# Patient Record
Sex: FEMALE | Race: WHITE | NOT HISPANIC OR LATINO | Employment: UNEMPLOYED | URBAN - METROPOLITAN AREA
[De-identification: names, ages, dates, MRNs, and addresses within clinical notes are randomized per-mention and may not be internally consistent; named-entity substitution may affect disease eponyms.]

---

## 2017-05-04 ENCOUNTER — HOSPITAL ENCOUNTER (EMERGENCY)
Facility: MEDICAL CENTER | Age: 42
End: 2017-05-04
Attending: EMERGENCY MEDICINE
Payer: MEDICAID

## 2017-05-04 VITALS
WEIGHT: 250 LBS | DIASTOLIC BLOOD PRESSURE: 49 MMHG | OXYGEN SATURATION: 97 % | SYSTOLIC BLOOD PRESSURE: 100 MMHG | BODY MASS INDEX: 37.03 KG/M2 | RESPIRATION RATE: 14 BRPM | HEIGHT: 69 IN | TEMPERATURE: 97.2 F | HEART RATE: 88 BPM

## 2017-05-04 VITALS
HEIGHT: 68 IN | TEMPERATURE: 98.2 F | SYSTOLIC BLOOD PRESSURE: 127 MMHG | DIASTOLIC BLOOD PRESSURE: 51 MMHG | WEIGHT: 260 LBS | HEART RATE: 71 BPM | RESPIRATION RATE: 19 BRPM | BODY MASS INDEX: 39.4 KG/M2
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
## 2017-05-04 LAB — GLUCOSE BLD-MCNC: 155 MG/DL (ref 65–99)

## 2017-05-04 PROCEDURE — 99283 EMERGENCY DEPT VISIT LOW MDM: CPT

## 2017-05-04 PROCEDURE — 302449 STATCHG TRIAGE ONLY (STATISTIC)

## 2017-05-04 PROCEDURE — 82962 GLUCOSE BLOOD TEST: CPT

## 2017-05-04 PROCEDURE — 36415 COLL VENOUS BLD VENIPUNCTURE: CPT

## 2017-05-04 RX ORDER — SODIUM CHLORIDE 9 MG/ML
1000 INJECTION, SOLUTION INTRAVENOUS CONTINUOUS
Status: DISCONTINUED | OUTPATIENT
Start: 2017-05-04 | End: 2017-05-04 | Stop reason: HOSPADM

## 2017-05-04 RX ORDER — PHENOBARBITAL 30 MG/1
86 TABLET ORAL 2 TIMES DAILY
COMMUNITY
End: 2017-07-17

## 2017-05-04 RX ORDER — LISINOPRIL 10 MG/1
10 TABLET ORAL DAILY
COMMUNITY
End: 2017-07-17

## 2017-05-04 RX ORDER — HYDROCODONE BITARTRATE AND ACETAMINOPHEN 10; 325 MG/1; MG/1
1-2 TABLET ORAL EVERY 6 HOURS PRN
Qty: 8 TAB | Refills: 0 | Status: SHIPPED | OUTPATIENT
Start: 2017-05-04 | End: 2017-07-17

## 2017-05-04 ASSESSMENT — PAIN SCALES - GENERAL: PAINLEVEL_OUTOF10: 8

## 2017-05-04 NOTE — ED AVS SNAPSHOT
[employer] Access Code: [email or web address]  Expires: 6/3/2017  8:24 PM    Your email address is not on file at [facility].  Email Addresses are required for you to sign up for [employer], please contact [phone] to verify your personal information and to provide your email address prior to attempting to register for [employer].    Nohemi Michele  [address] [place] [address]    Spectralmindt  A secure, online tool to manage your health information     [facility]’s [employer]® is a secure, online tool that connects you to your personalized health information from the privacy of your home -- day or night - making it very easy for you to manage your healthcare. Once the activation process is completed, you can even access your medical information using the [employer] karma, which is available for free in the Apple Karma store or Google Play store.     To learn more about [employer], visit [email or web address]    There are two levels of access available (as shown below):   My Chart Features  [facility] Primary Care Doctor [facility]  Specialists [facility]  Urgent  Care Non-[facility] Primary Care Doctor   Email your healthcare team securely and privately 24/7 X X X    Manage appointments: schedule your next appointment; view details of past/upcoming appointments X      Request prescription refills. X      View recent personal medical records, including lab and immunizations X X X X   View health record, including health history, allergies, medications X X X X   Read reports about your outpatient visits, procedures, consult and ER notes X X X X   See your discharge summary, which is a recap of your hospital and/or ER visit that includes your diagnosis, lab results, and care plan X X  X     How to register for [employer]t:  Once your e-mail address has been verified, follow the following steps to sign up for [employer].     1. Go to  [email or web address]  2. Click on the Sign Up Now box, which takes you to the New Member Sign Up page. You  will need to provide the following information:  a. Enter your [employer] Access Code exactly as it appears at the top of this page. (You will not need to use this code after you’ve completed the sign-up process. If you do not sign up before the expiration date, you must request a new code.)   b. Enter your date of birth.   c. Enter your home email address.   d. Click Submit, and follow the next screen’s instructions.  3. Create a Cube Routet ID. This will be your [employer] Profiles login ID and cannot be changed, so think of one that is secure and easy to remember.  4. Create a [employer] password. You can change your password at any time.  5. Enter your Password Reset Question and Answer. This can be used at a later time if you forget your password.   6. Enter your e-mail address. This allows you to receive e-mail notifications when new information is available in [employer]files.  7. Click Sign Up. You can now view your health information.    For assistance activating your [employer] account, call [phone]
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
## 2017-05-05 ENCOUNTER — PATIENT OUTREACH (OUTPATIENT)
Dept: HEALTH INFORMATION MANAGEMENT | Facility: OTHER | Age: 42
End: 2017-05-05

## 2017-05-05 NOTE — ED NOTES
Patient adamant about leaving ama despite education.    Nohemi Michele patient has chosen to leave the hospital against medical advice. The attending physician has not discharged the patient. Patient is not a risk to himself or others. I have discussed with the patient the following:  Physician has not determined patient is ready for discharge.      Discharge against medical advice form has been Signed.      No attending physician as of time patient left AMA.
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
## 2017-05-05 NOTE — ED NOTES
Patient agitated, threatening to leave and insisting that she needs to walk out because she is hungry and tired.  Pt encouraged to remain in hospital d/t multiple seizures in past few hours.  Patient resting on gurney at this time.
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
## 2017-05-05 NOTE — ED NOTES
"Pt had another \"neuro type spasm\".  Pt was alert the entire time able to respond to the nurse.  Vital signs stable throughout event.  "
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
## 2017-07-17 ENCOUNTER — APPOINTMENT (OUTPATIENT)
Dept: RADIOLOGY | Facility: MEDICAL CENTER | Age: 42
DRG: 101 | End: 2017-07-17
Attending: EMERGENCY MEDICINE
Payer: MEDICAID

## 2017-07-17 ENCOUNTER — RESOLUTE PROFESSIONAL BILLING HOSPITAL PROF FEE (OUTPATIENT)
Dept: HOSPITALIST | Facility: MEDICAL CENTER | Age: 42
End: 2017-07-17
Payer: MEDICAID

## 2017-07-17 ENCOUNTER — HOSPITAL ENCOUNTER (INPATIENT)
Facility: MEDICAL CENTER | Age: 42
LOS: 1 days | DRG: 101 | End: 2017-07-17
Attending: EMERGENCY MEDICINE | Admitting: HOSPITALIST
Payer: MEDICAID

## 2017-07-17 VITALS
TEMPERATURE: 98.4 F | HEART RATE: 90 BPM | DIASTOLIC BLOOD PRESSURE: 83 MMHG | WEIGHT: 293 LBS | SYSTOLIC BLOOD PRESSURE: 138 MMHG | OXYGEN SATURATION: 93 % | BODY MASS INDEX: 44.41 KG/M2 | HEIGHT: 68 IN | RESPIRATION RATE: 18 BRPM

## 2017-07-17 DIAGNOSIS — R56.9 SEIZURE (HCC): ICD-10-CM

## 2017-07-17 DIAGNOSIS — F11.20 NARCOTIC DEPENDENCE, EPISODIC USE (HCC): ICD-10-CM

## 2017-07-17 DIAGNOSIS — R60.9 PERIPHERAL EDEMA: ICD-10-CM

## 2017-07-17 PROBLEM — E66.9 OBESITY: Status: ACTIVE | Noted: 2017-07-17

## 2017-07-17 PROBLEM — I10 HYPERTENSION: Status: ACTIVE | Noted: 2017-07-17

## 2017-07-17 PROBLEM — E11.9 TYPE II DIABETES MELLITUS (HCC): Status: ACTIVE | Noted: 2017-07-17

## 2017-07-17 PROBLEM — R25.2 LEG CRAMPS: Status: ACTIVE | Noted: 2017-07-17

## 2017-07-17 PROBLEM — R60.0 LEG EDEMA: Status: ACTIVE | Noted: 2017-07-17

## 2017-07-17 LAB
ALBUMIN SERPL BCP-MCNC: 4.2 G/DL (ref 3.2–4.9)
ALBUMIN/GLOB SERPL: 1.4 G/DL
ALP SERPL-CCNC: 86 U/L (ref 30–99)
ALT SERPL-CCNC: 11 U/L (ref 2–50)
ANION GAP SERPL CALC-SCNC: 6 MMOL/L (ref 0–11.9)
AST SERPL-CCNC: 11 U/L (ref 12–45)
BASOPHILS # BLD AUTO: 0.4 % (ref 0–1.8)
BASOPHILS # BLD: 0.04 K/UL (ref 0–0.12)
BILIRUB SERPL-MCNC: 0.8 MG/DL (ref 0.1–1.5)
BNP SERPL-MCNC: 9 PG/ML (ref 0–100)
BUN SERPL-MCNC: 9 MG/DL (ref 8–22)
CALCIUM SERPL-MCNC: 9.2 MG/DL (ref 8.5–10.5)
CHLORIDE SERPL-SCNC: 101 MMOL/L (ref 96–112)
CK SERPL-CCNC: 97 U/L (ref 0–154)
CO2 SERPL-SCNC: 28 MMOL/L (ref 20–33)
CREAT SERPL-MCNC: 0.94 MG/DL (ref 0.5–1.4)
EOSINOPHIL # BLD AUTO: 0.21 K/UL (ref 0–0.51)
EOSINOPHIL NFR BLD: 2.3 % (ref 0–6.9)
ERYTHROCYTE [DISTWIDTH] IN BLOOD BY AUTOMATED COUNT: 47.3 FL (ref 35.9–50)
GFR SERPL CREATININE-BSD FRML MDRD: >60 ML/MIN/1.73 M 2
GLOBULIN SER CALC-MCNC: 3 G/DL (ref 1.9–3.5)
GLUCOSE SERPL-MCNC: 106 MG/DL (ref 65–99)
HCT VFR BLD AUTO: 39 % (ref 37–47)
HGB BLD-MCNC: 12.8 G/DL (ref 12–16)
IMM GRANULOCYTES # BLD AUTO: 0.02 K/UL (ref 0–0.11)
IMM GRANULOCYTES NFR BLD AUTO: 0.2 % (ref 0–0.9)
LYMPHOCYTES # BLD AUTO: 3.69 K/UL (ref 1–4.8)
LYMPHOCYTES NFR BLD: 41.3 % (ref 22–41)
MCH RBC QN AUTO: 29.8 PG (ref 27–33)
MCHC RBC AUTO-ENTMCNC: 32.8 G/DL (ref 33.6–35)
MCV RBC AUTO: 90.7 FL (ref 81.4–97.8)
MONOCYTES # BLD AUTO: 0.5 K/UL (ref 0–0.85)
MONOCYTES NFR BLD AUTO: 5.6 % (ref 0–13.4)
NEUTROPHILS # BLD AUTO: 4.48 K/UL (ref 2–7.15)
NEUTROPHILS NFR BLD: 50.2 % (ref 44–72)
NRBC # BLD AUTO: 0 K/UL
NRBC BLD AUTO-RTO: 0 /100 WBC
PLATELET # BLD AUTO: 286 K/UL (ref 164–446)
PMV BLD AUTO: 9.4 FL (ref 9–12.9)
POTASSIUM SERPL-SCNC: 3.8 MMOL/L (ref 3.6–5.5)
PROT SERPL-MCNC: 7.2 G/DL (ref 6–8.2)
RBC # BLD AUTO: 4.3 M/UL (ref 4.2–5.4)
SODIUM SERPL-SCNC: 135 MMOL/L (ref 135–145)
TROPONIN I SERPL-MCNC: <0.01 NG/ML (ref 0–0.04)
WBC # BLD AUTO: 8.9 K/UL (ref 4.8–10.8)

## 2017-07-17 PROCEDURE — 700101 HCHG RX REV CODE 250: Performed by: HOSPITALIST

## 2017-07-17 PROCEDURE — 99223 1ST HOSP IP/OBS HIGH 75: CPT | Performed by: HOSPITALIST

## 2017-07-17 PROCEDURE — A9270 NON-COVERED ITEM OR SERVICE: HCPCS | Performed by: HOSPITALIST

## 2017-07-17 PROCEDURE — 82550 ASSAY OF CK (CPK): CPT

## 2017-07-17 PROCEDURE — 95951 EEG: CPT | Mod: 52

## 2017-07-17 PROCEDURE — 84484 ASSAY OF TROPONIN QUANT: CPT

## 2017-07-17 PROCEDURE — 94640 AIRWAY INHALATION TREATMENT: CPT

## 2017-07-17 PROCEDURE — 304562 HCHG STAT O2 MASK/CANNULA

## 2017-07-17 PROCEDURE — 99285 EMERGENCY DEPT VISIT HI MDM: CPT

## 2017-07-17 PROCEDURE — 94760 N-INVAS EAR/PLS OXIMETRY 1: CPT

## 2017-07-17 PROCEDURE — 80053 COMPREHEN METABOLIC PANEL: CPT

## 2017-07-17 PROCEDURE — 83880 ASSAY OF NATRIURETIC PEPTIDE: CPT

## 2017-07-17 PROCEDURE — 700102 HCHG RX REV CODE 250 W/ 637 OVERRIDE(OP): Performed by: HOSPITALIST

## 2017-07-17 PROCEDURE — 93005 ELECTROCARDIOGRAM TRACING: CPT | Performed by: EMERGENCY MEDICINE

## 2017-07-17 PROCEDURE — 700111 HCHG RX REV CODE 636 W/ 250 OVERRIDE (IP): Performed by: EMERGENCY MEDICINE

## 2017-07-17 PROCEDURE — 700111 HCHG RX REV CODE 636 W/ 250 OVERRIDE (IP)

## 2017-07-17 PROCEDURE — 71010 DX-CHEST-PORTABLE (1 VIEW): CPT

## 2017-07-17 PROCEDURE — 85025 COMPLETE CBC W/AUTO DIFF WBC: CPT

## 2017-07-17 PROCEDURE — 700111 HCHG RX REV CODE 636 W/ 250 OVERRIDE (IP): Performed by: HOSPITALIST

## 2017-07-17 PROCEDURE — 700101 HCHG RX REV CODE 250: Performed by: EMERGENCY MEDICINE

## 2017-07-17 PROCEDURE — 70450 CT HEAD/BRAIN W/O DYE: CPT

## 2017-07-17 PROCEDURE — 96367 TX/PROPH/DG ADDL SEQ IV INF: CPT

## 2017-07-17 PROCEDURE — 96375 TX/PRO/DX INJ NEW DRUG ADDON: CPT

## 2017-07-17 PROCEDURE — 96365 THER/PROPH/DIAG IV INF INIT: CPT

## 2017-07-17 PROCEDURE — 700105 HCHG RX REV CODE 258: Performed by: EMERGENCY MEDICINE

## 2017-07-17 PROCEDURE — 770006 HCHG ROOM/CARE - MED/SURG/GYN SEMI*

## 2017-07-17 RX ORDER — LORAZEPAM 2 MG/ML
INJECTION INTRAMUSCULAR
Status: COMPLETED
Start: 2017-07-17 | End: 2017-07-17

## 2017-07-17 RX ORDER — AMOXICILLIN 250 MG
2 CAPSULE ORAL 2 TIMES DAILY
Status: DISCONTINUED | OUTPATIENT
Start: 2017-07-17 | End: 2017-07-18 | Stop reason: HOSPADM

## 2017-07-17 RX ORDER — ONDANSETRON 4 MG/1
4 TABLET, ORALLY DISINTEGRATING ORAL EVERY 4 HOURS PRN
Status: DISCONTINUED | OUTPATIENT
Start: 2017-07-17 | End: 2017-07-18 | Stop reason: HOSPADM

## 2017-07-17 RX ORDER — SODIUM CHLORIDE AND POTASSIUM CHLORIDE 150; 900 MG/100ML; MG/100ML
INJECTION, SOLUTION INTRAVENOUS CONTINUOUS
Status: DISCONTINUED | OUTPATIENT
Start: 2017-07-17 | End: 2017-07-18 | Stop reason: HOSPADM

## 2017-07-17 RX ORDER — DEXTROSE MONOHYDRATE 25 G/50ML
25 INJECTION, SOLUTION INTRAVENOUS
Status: DISCONTINUED | OUTPATIENT
Start: 2017-07-17 | End: 2017-07-18 | Stop reason: HOSPADM

## 2017-07-17 RX ORDER — METHYLPREDNISOLONE SODIUM SUCCINATE 125 MG/2ML
125 INJECTION, POWDER, LYOPHILIZED, FOR SOLUTION INTRAMUSCULAR; INTRAVENOUS ONCE
Status: COMPLETED | OUTPATIENT
Start: 2017-07-17 | End: 2017-07-17

## 2017-07-17 RX ORDER — ONDANSETRON 2 MG/ML
4 INJECTION INTRAMUSCULAR; INTRAVENOUS EVERY 4 HOURS PRN
Status: DISCONTINUED | OUTPATIENT
Start: 2017-07-17 | End: 2017-07-18 | Stop reason: HOSPADM

## 2017-07-17 RX ORDER — LORAZEPAM 2 MG/ML
1 INJECTION INTRAMUSCULAR ONCE
Status: COMPLETED | OUTPATIENT
Start: 2017-07-17 | End: 2017-07-17

## 2017-07-17 RX ORDER — KETOROLAC TROMETHAMINE 30 MG/ML
30 INJECTION, SOLUTION INTRAMUSCULAR; INTRAVENOUS ONCE
Status: COMPLETED | OUTPATIENT
Start: 2017-07-17 | End: 2017-07-17

## 2017-07-17 RX ORDER — GABAPENTIN 100 MG/1
200 CAPSULE ORAL 3 TIMES DAILY
COMMUNITY
End: 2017-09-11

## 2017-07-17 RX ORDER — PROMETHAZINE HYDROCHLORIDE 25 MG/1
12.5-25 SUPPOSITORY RECTAL EVERY 4 HOURS PRN
Status: DISCONTINUED | OUTPATIENT
Start: 2017-07-17 | End: 2017-07-18 | Stop reason: HOSPADM

## 2017-07-17 RX ORDER — PHENYTOIN SODIUM 100 MG/1
300 CAPSULE, EXTENDED RELEASE ORAL NIGHTLY
Status: DISCONTINUED | OUTPATIENT
Start: 2017-07-17 | End: 2017-07-18 | Stop reason: HOSPADM

## 2017-07-17 RX ORDER — PROMETHAZINE HYDROCHLORIDE 25 MG/1
12.5-25 TABLET ORAL EVERY 4 HOURS PRN
Status: DISCONTINUED | OUTPATIENT
Start: 2017-07-17 | End: 2017-07-18 | Stop reason: HOSPADM

## 2017-07-17 RX ORDER — MIRTAZAPINE 15 MG/1
15 TABLET, ORALLY DISINTEGRATING ORAL NIGHTLY
Status: DISCONTINUED | OUTPATIENT
Start: 2017-07-17 | End: 2017-07-18 | Stop reason: HOSPADM

## 2017-07-17 RX ORDER — IPRATROPIUM BROMIDE AND ALBUTEROL SULFATE 2.5; .5 MG/3ML; MG/3ML
3 SOLUTION RESPIRATORY (INHALATION) CONTINUOUS
Status: DISCONTINUED | OUTPATIENT
Start: 2017-07-17 | End: 2017-07-18 | Stop reason: HOSPADM

## 2017-07-17 RX ORDER — TIZANIDINE 4 MG/1
4 TABLET ORAL EVERY 6 HOURS PRN
Status: DISCONTINUED | OUTPATIENT
Start: 2017-07-17 | End: 2017-07-18 | Stop reason: HOSPADM

## 2017-07-17 RX ORDER — HEPARIN SODIUM 5000 [USP'U]/ML
5000 INJECTION, SOLUTION INTRAVENOUS; SUBCUTANEOUS EVERY 8 HOURS
Status: DISCONTINUED | OUTPATIENT
Start: 2017-07-17 | End: 2017-07-18 | Stop reason: HOSPADM

## 2017-07-17 RX ORDER — PHENOBARBITAL 32.4 MG/1
60 TABLET ORAL 2 TIMES DAILY
Status: DISCONTINUED | OUTPATIENT
Start: 2017-07-17 | End: 2017-07-18 | Stop reason: HOSPADM

## 2017-07-17 RX ORDER — BISACODYL 10 MG
10 SUPPOSITORY, RECTAL RECTAL
Status: DISCONTINUED | OUTPATIENT
Start: 2017-07-17 | End: 2017-07-18 | Stop reason: HOSPADM

## 2017-07-17 RX ORDER — MAGNESIUM SULFATE HEPTAHYDRATE 40 MG/ML
2 INJECTION, SOLUTION INTRAVENOUS ONCE
Status: COMPLETED | OUTPATIENT
Start: 2017-07-17 | End: 2017-07-17

## 2017-07-17 RX ORDER — LISINOPRIL 20 MG/1
20 TABLET ORAL DAILY
COMMUNITY

## 2017-07-17 RX ORDER — OCTREOTIDE ACETATE 100 UG/ML
50 INJECTION, SOLUTION INTRAVENOUS; SUBCUTANEOUS ONCE
Status: DISCONTINUED | OUTPATIENT
Start: 2017-07-17 | End: 2017-07-17

## 2017-07-17 RX ORDER — LORAZEPAM 1 MG/1
0.5 TABLET ORAL EVERY 6 HOURS PRN
Status: DISCONTINUED | OUTPATIENT
Start: 2017-07-17 | End: 2017-07-18 | Stop reason: HOSPADM

## 2017-07-17 RX ORDER — FLUOXETINE HYDROCHLORIDE 20 MG/1
20 CAPSULE ORAL DAILY
COMMUNITY

## 2017-07-17 RX ORDER — FLUOXETINE HYDROCHLORIDE 20 MG/1
20 CAPSULE ORAL DAILY
Status: DISCONTINUED | OUTPATIENT
Start: 2017-07-18 | End: 2017-07-18 | Stop reason: HOSPADM

## 2017-07-17 RX ORDER — LORAZEPAM 2 MG/ML
0.5 INJECTION INTRAMUSCULAR EVERY 6 HOURS PRN
Status: DISCONTINUED | OUTPATIENT
Start: 2017-07-17 | End: 2017-07-18 | Stop reason: HOSPADM

## 2017-07-17 RX ORDER — LISINOPRIL 20 MG/1
20 TABLET ORAL DAILY
Status: DISCONTINUED | OUTPATIENT
Start: 2017-07-18 | End: 2017-07-18 | Stop reason: HOSPADM

## 2017-07-17 RX ORDER — ACETAMINOPHEN 325 MG/1
650 TABLET ORAL EVERY 6 HOURS PRN
Status: DISCONTINUED | OUTPATIENT
Start: 2017-07-17 | End: 2017-07-18 | Stop reason: HOSPADM

## 2017-07-17 RX ORDER — DIVALPROEX SODIUM 125 MG/1
750 CAPSULE, COATED PELLETS ORAL EVERY 12 HOURS
Status: DISCONTINUED | OUTPATIENT
Start: 2017-07-17 | End: 2017-07-18 | Stop reason: HOSPADM

## 2017-07-17 RX ORDER — POLYETHYLENE GLYCOL 3350 17 G/17G
1 POWDER, FOR SOLUTION ORAL
Status: DISCONTINUED | OUTPATIENT
Start: 2017-07-17 | End: 2017-07-18 | Stop reason: HOSPADM

## 2017-07-17 RX ORDER — MIRTAZAPINE 15 MG/1
15 TABLET, ORALLY DISINTEGRATING ORAL NIGHTLY
COMMUNITY

## 2017-07-17 RX ADMIN — LORAZEPAM 1 MG: 2 INJECTION INTRAMUSCULAR; INTRAVENOUS at 16:41

## 2017-07-17 RX ADMIN — LORAZEPAM 1 MG: 2 INJECTION INTRAMUSCULAR at 15:29

## 2017-07-17 RX ADMIN — PHENYTOIN SODIUM 1000 MG: 50 INJECTION INTRAMUSCULAR; INTRAVENOUS at 16:41

## 2017-07-17 RX ADMIN — LORAZEPAM 0.5 MG: 1 TABLET ORAL at 18:56

## 2017-07-17 RX ADMIN — IPRATROPIUM BROMIDE AND ALBUTEROL SULFATE 3 ML: .5; 3 SOLUTION RESPIRATORY (INHALATION) at 14:47

## 2017-07-17 RX ADMIN — LORAZEPAM 1 MG: 2 INJECTION INTRAMUSCULAR; INTRAVENOUS at 15:07

## 2017-07-17 RX ADMIN — KETOROLAC TROMETHAMINE 30 MG: 30 INJECTION, SOLUTION INTRAMUSCULAR at 14:30

## 2017-07-17 RX ADMIN — KETOROLAC TROMETHAMINE 30 MG: 30 INJECTION, SOLUTION INTRAMUSCULAR at 18:56

## 2017-07-17 RX ADMIN — LORAZEPAM 1 MG: 2 INJECTION INTRAMUSCULAR at 15:07

## 2017-07-17 RX ADMIN — MAGNESIUM SULFATE IN WATER 2 G: 40 INJECTION, SOLUTION INTRAVENOUS at 19:47

## 2017-07-17 RX ADMIN — POTASSIUM CHLORIDE AND SODIUM CHLORIDE: 900; 150 INJECTION, SOLUTION INTRAVENOUS at 20:41

## 2017-07-17 RX ADMIN — METHYLPREDNISOLONE SODIUM SUCCINATE 125 MG: 125 INJECTION, POWDER, FOR SOLUTION INTRAMUSCULAR; INTRAVENOUS at 14:03

## 2017-07-17 RX ADMIN — LORAZEPAM 1 MG: 2 INJECTION INTRAMUSCULAR; INTRAVENOUS at 15:29

## 2017-07-17 ASSESSMENT — ENCOUNTER SYMPTOMS
TINGLING: 0
BRUISES/BLEEDS EASILY: 0
FEVER: 0
WEIGHT LOSS: 0
SEIZURES: 1
CARDIOVASCULAR NEGATIVE: 1
SPEECH CHANGE: 0
COUGH: 0
LOSS OF CONSCIOUSNESS: 1
DIZZINESS: 0
FLANK PAIN: 0
FOCAL WEAKNESS: 0
SHORTNESS OF BREATH: 0
CHILLS: 0
NAUSEA: 0
GASTROINTESTINAL NEGATIVE: 1
WEAKNESS: 0
ABDOMINAL PAIN: 0
VOMITING: 0
SENSORY CHANGE: 0
BACK PAIN: 0
RESPIRATORY NEGATIVE: 1
DEPRESSION: 1
MYALGIAS: 1
TREMORS: 1
NERVOUS/ANXIOUS: 1
CONSTITUTIONAL NEGATIVE: 1

## 2017-07-17 ASSESSMENT — PAIN SCALES - GENERAL
PAINLEVEL_OUTOF10: 8
PAINLEVEL_OUTOF10: 7
PAINLEVEL_OUTOF10: 10
PAINLEVEL_OUTOF10: 10
PAINLEVEL_OUTOF10: 4

## 2017-07-17 NOTE — ED NOTES
"Med rec updated and complete  Allergies reviewed  Pt states \"I have not had my seizure medications for over 3 month, I'm not sure what the names are\".   [place]  [place] was able to tell me her pharmacy here in [place], but then had a seizure\".  Called [facility] @ [phone] to verify pts medications.   Pt was able to tell me when she took her LISINOPRIL.   "

## 2017-07-17 NOTE — ED PROVIDER NOTES
ED Provider Note    Scribed for Isrrael Flores M.D. by Efrem Robles. 7/17/2017  1:13 PM    Means of arrival: EMS  History obtained from: Patient  History limited by: None    CHIEF COMPLAINT  Chief Complaint   Patient presents with   • Seizure     BIB EMS from work, pt moved here recently and has been off her sz meds x 3 months. pt has possible seizure at work and EMS was called. pt reports that she attempted to stand and could not stand up. has had increasing BLE edema for last few days.    • Peripheral Edema   • Anxiety       HPI  Nohemi Michele is a 41 y.o. Smoker with a history of COPD who presents to the Emergency Department complaining of seizures. At least one seizure was witnessed today while the patient was at work and woke up in the ambulance. The patient works in town at a call center. Patient has been non compliant with her seizure medication for the last three months because her current PCP is unwilling to write her for these medications. The patient has been on these medications since she was 17 years old. She denies any recent seizures. The patient reports bilateral lower extremity edema onset two weeks ago. Additionally, the patient went to break during work today and could not feel her lower extremities secondary to lower extremity neuropathy. She states that her grandmother had bilateral lower extremity amputations after exhibiting similar symptoms to what the patient presents with today. The patient denies any chest pain today. She smokes a pack of cigarettes a day and smokes marijuana occasionally. The patient states that she is an alcoholic, but she did not drink today.    REVIEW OF SYSTEMS  Pertinent negatives include no chest pain. As above, all other systems reviewed and are negative.   See HPI for further details.  C     PAST MEDICAL HISTORY   has a past medical history of Epilepsy (CMS-[facility]); Diabetes (CMS-[facility]); and Hypertension.    SURGICAL HISTORY   has past surgical history that  "includes hysterectomy radical and gyn surgery.    SOCIAL HISTORY  Social History   Substance Use Topics   • Smoking status: Current Every Day Smoker -- 1.50 packs/day     Types: Cigarettes   • Smokeless tobacco: None   • Alcohol Use: Yes      Comment: \"a fifth when I drink\"      History   Drug Use   • Yes     Comment: marijuana       FAMILY HISTORY  History reviewed. No pertinent family history.    CURRENT MEDICATIONS  Home Medications     Reviewed by Dasha Yates (Pharmacy Tech) on 07/17/17 at 1539  Med List Status: Complete    Medication Last Dose Status    fluoxetine (PROZAC) 20 MG Cap Unknown Active    gabapentin (NEURONTIN) 100 MG Cap Unknown Active    insulin glargine (BASAGLAR KWIKPEN) 100 UNIT/ML Solution Pen-injector injection Unknown Active    lisinopril (PRINIVIL) 20 MG Tab 7/17/2017 Active    mirtazapine (REMERON) 15 MG TABLET DISPERSIBLE Unknown Active                ALLERGIES  No Known Allergies    PHYSICAL EXAM  VITAL SIGNS: /96 mmHg  Pulse 78  Temp(Src) 36.4 °C (97.5 °F)  Resp 16  Ht 1.727 m (5' 7.99\")  Wt 127.007 kg (280 lb)  BMI 42.58 kg/m2  SpO2 98%  Constitutional: Well developed, Well nourished, Moderate distress, Non-toxic appearance. Produces tears.  HENT: Normocephalic, Atraumatic, Bilateral external ears normal, Oropharynx is clear mucous membranes are moist. No oral exudates or nasal discharge.   Eyes: Pupils are equal round and reactive, EOMI, Conjunctiva normal, No discharge.   Neck: Normal range of motion, No tenderness, Supple, No stridor. No meningismus.  Lymphatic: No lymphadenopathy noted.   Cardiovascular: Regular rate and rhythm without murmur rub or gallop.  Thorax & Lungs: Tachypnea. Clear breath sounds bilaterally without rhonchi or rales. Bibasilar wheezing. There is no chest wall tenderness.   Abdomen: Soft non-tender non-distended. There is no rebound or guarding. No organomegaly is appreciated. Bowel sounds are normal.  Skin: Normal without rash. "   Back: No CVA or spinal tenderness.   Extremities: Intact distal pulses, Bilateral 2+ non-pitting edema., No tenderness, No cyanosis, No clubbing. Capillary refill is less than 2 seconds.  Musculoskeletal: Good range of motion in all major joints. No tenderness to palpation or major deformities noted.   Neurologic: Alert & oriented x 3, Normal motor function, Normal sensory function, No focal deficits noted. Reflexes are normal.  Psychiatric: Affect normal, Judgment normal, Mood normal. There is no suicidal ideation or patient reported hallucinations.     DIAGNOSTIC STUDIES / PROCEDURES    LABS  Labs Reviewed   CBC WITH DIFFERENTIAL - Abnormal; Notable for the following:     MCHC 32.8 (*)     Lymphocytes 41.30 (*)     All other components within normal limits   COMP METABOLIC PANEL - Abnormal; Notable for the following:     Glucose 106 (*)     AST(SGOT) 11 (*)     All other components within normal limits   BTYPE NATRIURETIC PEPTIDE   TROPONIN   ESTIMATED GFR   DILANTIN   VALPROIC ACID   PHENOBARBITAL   PROLACTIN   MAGNESIUM      All labs reviewed by me.    EKG Interpretation:  EKG Interpretation    Interpreted by me    Rhythm: normal sinus   Rate: normal  Axis: normal  Ectopy: none  Conduction: normal  ST Segments: no acute change  T Waves: no acute change, borderline abnormalities  Q Waves: none    Clinical Impression: no acute changes     RADIOLOGY  DX-CHEST-PORTABLE (1 VIEW)   Final Result      No acute cardiopulmonary process is seen.      CT-HEAD W/O    (Results Pending)   MR-BRAIN-W/O    (Results Pending)   MR-BRAIN-W/O    (Results Pending)     The radiologist's interpretation of all radiological studies have been reviewed by me.    COURSE & MEDICAL DECISION MAKING  Nursing notes, VS, PMSFHx reviewed in chart.    1:13 PM Patient seen and examined at bedside. Ordered for DX chest, EKG STAT, and labs to evaluate. Patient was treated with Duoneb solution 3 ml and Solu-medrol 3 ml for her symptoms. Isrrael Flores,  "M.D. spent approximately five minutes with the patient explaining the importance of smoking cessation. Patient states an extreme fear of losing her legs.    2:48 PM I ordered Toradol injection 30 mg for the patient's pain.  Laboratory evaluation reveals no evidence of leukocytosis, anemia or electrolyte derangements. BNP is normal at 9 and I will think her peripheral edema secondary to pulmonary edema/CHF. Troponin is normal. EKG is unremarkable.    3:06 PM I ordered Ativan injection 1 mg to treat given that she started to have an active seizure seen by staff and I saw her immediately thereafter with postictal confusion    3:13 PM I reexamined the patient; patient states that she is still experiencing extremity pain \"like nothing she has ever experienced before.\"    3:15 PM Paged Hospitalist.    3:16 PM I discussed the patient's case and the above findings with Dr. Carlos (Hospitalist) who agrees to transfer care of the patient at this time.    3:18 PM Paged Neurology.    3:25 PM Patient has a BVA in place at this time and had a second seizure. I ordered 1 mg Ativan injection and Dilantin 1 g in  ml IVPB to treat. Dr. Orozco at bedside.    3:30 PM I discussed the patient's case and the above findings with Dr. Thacker (Neurology) who agrees to evaluate the patient.    3:33 PM I ordered a CT head without contrast to evaluate. This is pending at this time    CRITICAL CARE  The very real possibility of a deterioration of this patient's condition required the highest level of my preparedness for sudden, emergent intervention.  I provided critical care services, which included medication orders, frequent reevaluations of the patient's condition and response to treatment, ordering and reviewing test results, and discussing the case with various consultants.  The critical care time associated with the care of the patient was 35 minutes. Review chart for interventions. This time is exclusive of any other billable " procedures.    DISPOSITION:  Patient will be admitted to Dr. Carlos, Hospitalist, in guarded condition.    Tobacco cessation counseling; five minutes    FINAL IMPRESSION  1. Seizure (CMS-[facility])    2. Narcotic dependence, episodic use (CMS-[facility])    3. Peripheral edema       Total critical care time of 35 minutes, as outlined above.    Spent 5 minutes consulting the patient about tobacco cessation.     Efrem DESIR (Scribe), am scribing for, and in the presence of, Isrrael Flores M.D..    Electronically signed by: Efrem Robles (Scribe), 7/17/2017    Isrrael DESIR M.D. personally performed the services described in this documentation, as scribed by Efrem Robles in my presence, and it is both accurate and complete.    The note accurately reflects work and decisions made by me.  Isrrael Flores  7/17/2017  4:14 PM

## 2017-07-17 NOTE — ED NOTES
Pt with noted tonic/clonic seizure activity lasting approximately 15 seconds. ERP notified. Pt medicated per MAR. Pt crying after activity stopped stating that her legs hurt. ERP aware.

## 2017-07-17 NOTE — CONSULTS
Chief complaint:   Neurologic consultation was requested by Dr. Flores in the emergency room admitted for recurrent seizures.    HPI:  Patient is a 41-year-old right handed  female with a history of seizures she states takes Bactrim at 17 years of age which follows a significant head trauma. On Dilantin 300 mg daily at bedtime, Depakote 750 mg, twice a day, and phenobarbital 60 mg, twice a day, she has been without the medications over the last 3 months since she moved here from [place]. She also has a history of alcohol abuse, she is usually on Antabuse with good benefit, and when on this and her seizure medicines, she actually has good seizure control.    Seizures typically occur at nighttime, she can recall visual changes and shimmering occurring initially, then some dizziness followed by what sounds like is loss of consciousness. Her eyes rolled into a her head, described tonic-clonic movements are seen, and when she awakened she is confused, she has headache and she is very tired. She has been incontinent and can bite the inside of her mouth. Over the last several weeks, she has had an increasing frequency of seizures. She has been under more stress, her father having  about one month ago, and it is for this reason she has come to the local area to be with her stepmother to help make arrangements. She works at a call center.    While at the call center earlier today, she had the visual distortions and then evidently suffered from another seizure. EMS was contacted, she began to recover the patient remembers nothing of this, only awakening here in the emergency room. In the emergency room, she evidently had another event witnessed by nursing staff Dr. Flores. She was given 2 mg of Ativan IV with seizure sensation. Patient's stepmother states that stress and anxiety often triggers her seizures. She has also begun to drink, last drink was 2 days ago. At this time the patient states she has headache, but  mentally she feels actually quite well.    She states her last EEG was done some time ago, remembers being told that they showed seizures. She denies myoclonic activity at nighttime, the complex partial seizure inventory is otherwise negative. With these events, there has been no change in her medication regimen otherwise, she does take gabapentin for her diabetes-related neuropathy pain, but she is inconsistent with this.    ROS:  She is suffering from severe leg cramps and edema over the last week, when this worsens acutely, it may be a trigger. Otherwise, as per AMA and CMS guidelines, the systems review is negative from my standpoint.    PMH:  1. Seizure disorder  2. Diabetes: Diagnosed about 6 years ago, there is some associated neuropathy  3. Hypertension  4. Obesity  5. Anxiety/depression  6. COPD    There is no history of malignancy, thyroid disease, MS, migraine, CVA, CAD, PVD, liver disease or kidney disease, autoimmune disease, blood dyscrasia, or hepatitis.    PSH:  None of note from a neurologic standpoint.    Medications:  1. The patient is no longer taking Depakote, Dilantin and phenobarbital prescribed as above  2. Neurontin 200 mg, 3 times a day  3. Prinivil 20 mg daily  4. Remeron 30 mg in the evening  5. Prozac 20 mg daily    Allergies:  None    Family history:  No history of seizures, migraines, CVA or neurodegenerative disease    Social history:  Patient smokes regular, has a history of heavy alcohol overuse, has begun to drink again. She works at a [employer] center. Despite her seizures, she is driving.    PE:  She appears in notable distress because of her leg pain and cramping sensations. Talking about not wanting to live and ending her life given the amount of pain she has, she is worried about having her legs amputated because of pain, etc.  Constitutional: Vital signs reveal a pulse oximetry of 94% on rebreather face mask, respiratory rate 13, blood pressure 157/87, and pulse is 112 but  regular.  HEENT: Negative for malar rash, temporal or joint tenderness, or jaw claudication  Neck: Supple without meningismus. Her body habitus prevents auscultation for bruits. Pulses are diminished but present bilaterally.  COR: Remarkable for a regular rhythm  Vascular: Significantly bilateral lower extremity edema seen.  Hematologic: Negative for bruising    Neurologic examination:  Mental status: She is fully oriented, there was no aphasia, apraxia, or hypertension.  Cranial nerve exam: PERRLA/EOMI, visual fields are full, there is no facial asymmetry, sensory loss, or bulbar dysfunction. Tongue and uvula midline.  Motor exam: There was no tremor, asterixis, or drift. Strength is grossly intact in the upper extremities, effort is limited in the lower extremities because of discomfort. Ankle jerks are absent, both toes are equivocal on plantar stimulation. Reflexes are symmetric in the upper extremities.  Coordination exam: There is no appendicular dystaxia with the upper extremities, fine motor control and repetitive movements are intact and symmetric in all 4 extremities. I could not assess coordination with the legs, I did not send the patient to walk.  Sensory exam: There is a stocking loss of vibration to the knees bilaterally, questionably graded loss of pinprick and temperature to the mid shins as well. These modalities are intact in the hands.    Diagnostics:  EEG and CT scan of the brain are pending as of this dictation. CMP is remarkable for glucose 106, unremarkable liver panel and renal profiles. Coagulation profile is pending. Hemogram is essentially normal. Drug levels are also pending.    Impression:  This patient seems to have a history of seizures, although there are some unusual features to them. EEG is already being done, she may require a 24-hour monitory video EEG platform to better characterize the seizures themselves, as the possibility of nonepileptic attacks does need to be considered.  "This seemed to be primarily generalized, occurring during sleep, we could consider myoclonic seizures. She is on a very old though not unheard of regimen of Dilantin, Depakote and phenobarbital. These probably should be reinitiated at their previous dosing especially since she states she has good control. The alcohol issues but he wonders further, I would recommend reinitiating Antabuse and following along with alcohol counseling.    Her edema itself could be due to a number of issues including poorly controlled blood pressure, poor glycemic control, cardiac or pulmonary issues, but I doubt is related to procedure process, certainly not to the fact that she is having more than. This will certainly make her pain she has from her diabetes worse. As whether this serves as a \"trigger\" seizure remains seen no anxiety and stress certainly can.    Face to face time was spent reviewing all of the above with patient, her stepmother as well as with Dr. Carlos from the hospitalist service. MRI imaging probably should be done as well, admit for observation and stabilization with outpatient follow-up eventually with our epilepsy clinic.    Plan:  1. Admit for observation and seizure precaution  2. Reloaded with Dilantin 1 g and Depakote 1 g, followed by reinitiation of Dilantin 300 mg daily at bedtime, Depakote 750 mg, twice a day, and phenobarbital 60 mg, twice a day. Daily drug levels rechecked over the next several days to make sure these remain stable.  3. MRI of the brain  4. Alcohol withdrawal monitoring  5. Reinitiate Neurontin at 300 mg, 3 times a day for neuropathic pain.  6. Follow-up EEG and if necessary, EEG monitoring with video  7. Boris for lower extremity edema as per Dr. Carlos  8. I will follow the patient with    Thank you very much for this consultation  Time: Evaluation of several visits for exam, review, discussion, and education  Discussion: As mentioned in the assessment, over 60% of the time spent " face-to-face counseling and coordinating care

## 2017-07-17 NOTE — ED NOTES
"Chief Complaint   Patient presents with   • Seizure     BIB EMS from work, pt moved here recently and has been off her sz meds x 3 months. pt has possible seizure at work and EMS was called. pt reports that she attempted to stand and could not stand up. has had increasing BLE edema for last few days.    • Peripheral Edema   • Anxiety     Pt with odd affect, crying intermittently. Pt yelling out \" I don't feel good.\"   States that she is off her depakote, dilantin, and phenobarb as well as xanax. Received 4mg zofran, 100mcg fentanyl and 1 mg versed PTA. Not noted to  Be post ictal at this time, denies loss of b or b. No oral injuries noted.   "

## 2017-07-18 NOTE — ED NOTES
RN escorted pt to [address], pt started grunting and foaming her mouth, RN then cracked an ammonia capsule, pt immediately swiped RN hand out of the way, immediately stopped grunting and foaming her mouth became angry asked why RN did that, RN explained reasoning and pt states she has seizures when she is awake. RN updated Neuro charge RN and primary RN.

## 2017-07-18 NOTE — EEG PROGRESS NOTE
"EEG tracing  Date of EEG: July 17, 2017    History:   Patient is a 41-year-old right-handed female diagnosed with epilepsy at 17 years of age, described as generalized epilepsy. Patient was admitted for increasing seizure frequency, off medications for more than 3 months. EEG is requested to rule out underlying nonconvulsive seizure activity and to better characterize the presence of her seizures and possibly to establish nonepileptic seizure activity.    Condition of recording:  This is a 21 channel, portable, digital video EEG tracing. Bipolar and referential montages are used. Both photic stimulation and hyperventilation are done. Prior to the tracing's beginning, the patient had a generalized shaking episode consistent with one of her typical seizures, lasting upwards of several minutes, and towards the end of the tracing, the patient again had one of her tremulousness episodes. She was also agitated and at times poorly cooperative. She is awake throughout the tracing. She is on no antiepileptics, but had been given Ativan more than an hour prior to the EEG.    Tracing description:  This tracing begins, there is an alpha frequency rhythm seen bilaterally over the occipital hemispheres at 10 H [place] Z, and suppresses with eye opening. No focal slowing or irritative features are seen. Bifrontal muscle and movement artifact is seen throughout the tracing, degrading image quality and limiting interpretation, never associated with epileptiform activity or other paroxysmal qualities and distortions of the background. During the tremulousness episodes, alpha frequency rhythm is seen bilaterally and posteriorly.    Photic stimulation revealed minimal driving response, no activation occurred. Hyperventilation revealed no buildup, no activation occurred.    Impression:  This is a normal awake EEG for a patient this age. No epileptiform activity was seen in association with the \"seizure\"-like activity the patient " demonstrated. Clinical correlation suggested. Recommendations for video EEG monitoring might be appropriate to better characterize these events.

## 2017-07-18 NOTE — PROGRESS NOTES
"Nohemi Michele patient has chosen to leave the hospital against medical advice. The attending physician has not discharged the patient. Patient is not a risk to himself or others. I have discussed with the patient the following:  Physician has not determined patient is ready for discharge, Risks and consequences of leaving the hospital too soon and Benefit of continued hospitalization.  Reviewed disease process, medication reconciliation, fluid replacement, lab values, smoking cessation, and DTV prophylaxis; pt still refused treatments and proceeded with leaving AMA.    Discharge against medical advice form has been signed by pt.      Attending physician has been notified; offered ROBYN Mcintosh to speak with pt over phone, ROBYN declined.  ORBYN Figueredo also invited to come speak with pt at bedside, ROBYN Figueredo declined as well.     ADDENDUM: Pt is female; pronoun \"himself\" used in error.  "

## 2017-07-18 NOTE — ED NOTES
"Pt refusing to go to floor or MRI without \"pain medication\". ERP and orders received. Pt medicated per orders.  "

## 2017-07-18 NOTE — ASSESSMENT & PLAN NOTE
Patient is on Lantus at home given that her blood sugar was 106 when she got here I am holding her Lantus at this time and covering her only with sliding scale insulin until her sugars are stabilized.  Diabetic diet and Accu-Cheks every before meals and at bedtime of an ordered.

## 2017-07-18 NOTE — PROGRESS NOTES
"Notified by NIMA Young Let tech that pt had a \"seizure\" in the [address] way. I was then notified by GERMANIA Quach RN that RN had seen the pt during this episode. Pt was A&Ox4, emotional, demanding food, refused to move over from the gurney to the bed. Slide board was provided and then moved over to bed without assistance from staff. Pt's family arrived at bedside, pt demanded to leave AMA. This RN re-educated pt of what primary RN Mark Current discussed with pt to include: stay for medical treatment, monitoring for seizures, medications for seizures, explained the plan of care. Family also encouraged pt to stay for treatment, pt still adament about leaving AMA. Informed pt in case of emergency to return to the ER or call 911. Pt signed AMA paperwork, ambulated off unit.            "

## 2017-07-18 NOTE — ASSESSMENT & PLAN NOTE
IV magnesium sulfate 2 g given. Zanaflex 8 mg by mouth every 6 hours as needed for muscle spasms    Patient is feeling better and leg cramps are improving

## 2017-07-18 NOTE — H&P
Hospital Medicine History and Physical    Date of Service  2017    Chief Complaint  Chief Complaint   Patient presents with   • Seizure     BIB EMS from work, pt moved here recently and has been off her sz meds x 3 months. pt has possible seizure at work and EMS was called. pt reports that she attempted to stand and could not stand up. has had increasing BLE edema for last few days.    • Peripheral Edema   • Anxiety       History of Presenting Illness  41 y.o. female who presented 2017 with a complaint of increase in seizure activity. Patient says that she has had a diagnosis of seizures since the age of 17. Formerly she was on phenobarbital Depakote and Dilantin. She's been off these medications since moving out here to [place] from [place] to help her stepmother with her father who recently . She states that her primary care provider here in [place] has refused to give her her seizure medications but has provided her with her other medications. Today she has been exhibiting shaking seizure activity. When I was in the room the patient stated she was having a seizure and was alert during this activity. She did have shaking of the limbs and foaming at the mouth. She states that she is an alcoholic and her last drink was 2 days ago but she did drink heavily. She is formerly been on Antabuse in says when she is on Antabuse and taking her seizure medications as prescribed she does not have seizures. She denies any recent illness no fever or chills cough shortness of breath, abdominal pain dysuria, or diarrhea. She is complaining of leg cramps during the examination and tells me that she thinks she is going to die from the leg cramps.    Primary Care Physician  Thomas Venegas M.D.    Consultants  Dr. Thacker, neurology.    Code Status  Full code    Review of Systems  Review of Systems   Constitutional: Negative.  Negative for fever, chills, weight loss and malaise/fatigue.   HENT: Negative.    Respiratory:  "Negative.  Negative for cough and shortness of breath.    Cardiovascular: Negative.  Negative for chest pain and leg swelling.   Gastrointestinal: Negative.  Negative for nausea, vomiting and abdominal pain.   Genitourinary: Negative.  Negative for dysuria and flank pain.   Musculoskeletal: Positive for myalgias. Negative for back pain.   Neurological: Positive for tremors, seizures and loss of consciousness. Negative for dizziness, tingling, sensory change, speech change, focal weakness and weakness.   Endo/Heme/Allergies: Negative.  Does not bruise/bleed easily.   Psychiatric/Behavioral: Positive for depression. The patient is nervous/anxious.    All other systems reviewed and are negative.         Past Medical History  Past Medical History   Diagnosis Date   • Epilepsy (CMS-[facility])    • Diabetes (CMS-HCC)    • Hypertension        Surgical History  Past Surgical History   Procedure Laterality Date   • Hysterectomy radical     • Gyn surgery       tubes tied       Medications  fluoxetine (PROZAC) 20 MG Cap  Take 20 mg by mouth every day.             gabapentin (NEURONTIN) 100 MG Cap  Take 200 mg by mouth 3 times a day.             insulin glargine (BASAGLAR KWIKPEN) 100 UNIT/ML Solution Pen-injector injection  Inject 25 Units as instructed every evening.             lisinopril (PRINIVIL) 20 MG Tab  Take 20 mg by mouth every day.             mirtazapine (REMERON) 15 MG TABLET DISPERSIBLE  Take 15 mg by mouth every evening.                   Family History  Family History   Problem Relation Age of Onset   • Alcohol/Drug Neg Hx    • Diabetes Father        Social History  Social History   Substance Use Topics   • Smoking status: Current Every Day Smoker -- 1.50 packs/day     Types: Cigarettes   • Smokeless tobacco: None   • Alcohol Use: Yes      Comment: \"a fifth when I drink\"       Allergies  No Known Allergies     Physical Exam  Laboratory   Hemodynamics  Temp (24hrs), Av.7 °C (98 °F), Min:36.4 °C (97.5 °F), Max:36.9 " °C (98.4 °F)   Temperature: 36.9 °C (98.4 °F)  Pulse  Av.4  Min: 69  Max: 112 Heart Rate (Monitored): 90  Blood Pressure: 138/83 mmHg, NIBP: 125/73 mmHg      Respiratory      Respiration: 18, Pulse Oximetry: 93 %, O2 Daily Delivery Respiratory : Room Air with O2 Available     Given By:: Mouthpiece  RUL Breath Sounds: Transmitted Upper Airway Sound, RML Breath Sounds: Transmitted Upper Airway Sound, RLL Breath Sounds: Transmitted Upper Airway Sound, KAIN Breath Sounds: Transmitted Upper Airway Sound, LLL Breath Sounds: Transmitted Upper Airway Sound    Physical Exam   Constitutional: She appears well-developed and well-nourished. She appears distressed.   Obese female thrashing anxious complaining of leg cramps   HENT:   Nose: Nose normal.   Mouth/Throat: Oropharynx is clear and moist. No oropharyngeal exudate.   Eyes: Conjunctivae are normal. Right eye exhibits no discharge. Left eye exhibits no discharge. No scleral icterus.   Neck: No JVD present. No tracheal deviation present.   Cardiovascular: Normal rate, regular rhythm and normal heart sounds.    Pulmonary/Chest: Effort normal and breath sounds normal. No stridor. No respiratory distress. She has no wheezes. She has no rales. She exhibits no tenderness.   Abdominal: Soft. Bowel sounds are normal. She exhibits no distension. There is no tenderness.   Musculoskeletal: She exhibits no edema or tenderness.   Neurological: She is alert. No cranial nerve deficit. She exhibits normal muscle tone.   Skin: Skin is warm and dry. She is not diaphoretic. No pallor.   Psychiatric: Her mood appears anxious. Her affect is angry and labile. Her speech is tangential. She is agitated. Cognition and memory are normal.   Nursing note and vitals reviewed.      Recent Labs      17   1320   WBC  8.9   RBC  4.30   HEMOGLOBIN  12.8   HEMATOCRIT  39.0   MCV  90.7   MCH  29.8   MCHC  32.8*   RDW  47.3   PLATELETCT  286   MPV  9.4     Recent Labs      17   1319   SODIUM   135   POTASSIUM  3.8   CHLORIDE  101   CO2  28   GLUCOSE  106*   BUN  9   CREATININE  0.94   CALCIUM  9.2     Recent Labs      07/17/17   1319   ALTSGPT  11   ASTSGOT  11*   ALKPHOSPHAT  86   TBILIRUBIN  0.8   GLUCOSE  106*         Recent Labs      07/17/17   1320   BNPBTYPENAT  9         Lab Results   Component Value Date    TROPONINI <0.01 07/17/2017       Imaging  EEG negative for seizure focus    CT of head is normal.   Assessment/Plan     I anticipate this patient will require at least two midnights for appropriate medical management, necessitating inpatient admission.    Seizures (CMS-[facility]) (present on admission)  Assessment & Plan  Dr. Michelle consulted. Discussed at length with him at the bedside and outside of the patient's room.  Witnessed seizure activity is not typical for tonic-clonic seizures as the patient is awake and can follow commands and is talking through the activity. Furthermore Dr. Michelle read her EEG this evening and did not find any evidence of seizure activity. Nonetheless she may actually have seizures at baseline and has been treated with Dilantin and phenobarbital and Depakote in the past. Dr. Michelle has loaded her with IV Dilantin and Depakote in the emergency department and I'm continuing these medications orally. MRI of the brain is ordered and is pending.     She will be admitted to the neurology unit and followed by the neurology service. She will need outpatient follow-up in the neurology clinic when she is discharged.    Hypertension (present on admission)  Assessment & Plan  Resume lisinopril, home medication.    Type II diabetes mellitus (CMS-[facility]) (present on admission)  Assessment & Plan  Patient is on Lantus at home given that her blood sugar was 106 when she got here I am holding her Lantus at this time and covering her only with sliding scale insulin until her sugars are stabilized.  Diabetic diet and Accu-Cheks every before meals and at bedtime of an ordered.    Leg cramps  (present on admission)  Assessment & Plan  IV magnesium sulfate 2 g given. Zanaflex 8 mg by mouth every 6 hours as needed for muscle spasms    Patient is feeling better and leg cramps are improving        VTE prophylaxis: heparin.

## 2017-07-18 NOTE — ASSESSMENT & PLAN NOTE
Dr. Michelle consulted. Discussed at length with him at the bedside and outside of the patient's room.  Witnessed seizure activity is not typical for tonic-clonic seizures as the patient is awake and can follow commands and is talking through the activity. Furthermore Dr. Michelle read her EEG this evening and did not find any evidence of seizure activity. Nonetheless she may actually have seizures at baseline and has been treated with Dilantin and phenobarbital and Depakote in the past. Dr. Mcihelle has loaded her with IV Dilantin and Depakote in the emergency department and I'm continuing these medications orally. MRI of the brain is ordered and is pending.     She will be admitted to the neurology unit and followed by the neurology service. She will need outpatient follow-up in the neurology clinic when she is discharged.

## 2017-07-28 LAB — EKG IMPRESSION: NORMAL

## 2017-09-03 ENCOUNTER — HOSPITAL ENCOUNTER (EMERGENCY)
Facility: MEDICAL CENTER | Age: 42
End: 2017-09-03
Attending: EMERGENCY MEDICINE
Payer: MEDICAID

## 2017-09-03 VITALS
TEMPERATURE: 98.7 F | HEIGHT: 69 IN | DIASTOLIC BLOOD PRESSURE: 90 MMHG | WEIGHT: 293 LBS | SYSTOLIC BLOOD PRESSURE: 168 MMHG | RESPIRATION RATE: 18 BRPM | OXYGEN SATURATION: 96 % | HEART RATE: 101 BPM | BODY MASS INDEX: 43.4 KG/M2

## 2017-09-03 DIAGNOSIS — S02.5XXA CLOSED FRACTURE OF TOOTH, INITIAL ENCOUNTER: ICD-10-CM

## 2017-09-03 DIAGNOSIS — K08.89 PAIN, DENTAL: ICD-10-CM

## 2017-09-03 PROCEDURE — 99283 EMERGENCY DEPT VISIT LOW MDM: CPT

## 2017-09-03 RX ORDER — PENICILLIN V POTASSIUM 500 MG/1
500 TABLET ORAL 4 TIMES DAILY
Qty: 40 TAB | Refills: 0 | Status: SHIPPED | OUTPATIENT
Start: 2017-09-03 | End: 2017-09-13

## 2017-09-03 ASSESSMENT — ENCOUNTER SYMPTOMS
CHILLS: 0
FEVER: 0

## 2017-09-03 ASSESSMENT — LIFESTYLE VARIABLES: DO YOU DRINK ALCOHOL: NO

## 2017-09-03 ASSESSMENT — PAIN SCALES - GENERAL: PAINLEVEL_OUTOF10: 6

## 2017-09-03 NOTE — DISCHARGE INSTRUCTIONS
Bone Grafting, Care After  Refer to this sheet in the next few weeks. These instructions provide you with information about caring for yourself after your procedure. Your health care provider may also give you more specific instructions. Your treatment has been planned according to current medical practices, but problems sometimes occur. Call your health care provider if you have any problems or questions after your procedure.  WHAT TO EXPECT AFTER THE PROCEDURE  After your procedure, it is common to have:  · Pain.  · Bruising.  · Swelling.  · Stiffness.  HOME CARE INSTRUCTIONS  If You Have a Cast:  · Do not stick anything inside the cast to scratch your skin. Doing that increases your risk of infection.  · Check the skin around the cast every day. Report any concerns to your health care provider. You may put lotion on dry skin around the edges of the cast. Do not apply lotion to the skin underneath the cast.  · Keep the cast clean and dry.  If You Have a Splint or a Brace:  · Wear it as directed by your health care provider. Remove it only as directed by your health care provider.  · Loosen the splint or brace if your fingers or toes become numb and tingle, or if they turn cold and blue.  · Keep the splint or brace clean and dry.  Bathing  · Do not take baths, swim, or use a hot tub until your health care provider approves. Ask your health care provider if you can take showers. You may only be allowed to take sponge baths for bathing.  · If your health care provider approves bathing and showering, cover the cast, splint, or brace with a watertight plastic bag to protect it from water. Do not let the cast, splint, or brace get wet.  · Keep the bandage (dressing) dry until your health care provider says it can be removed.  Incision Care  · There are many ways to close and cover an incision. For example, an incision can be closed with stitches (sutures), skin glue, or adhesive strips. Follow instructions from your  health care provider about:  ¨ How to take care of your incision.  ¨ When and how you should change your dressing.  ¨ When you should remove your dressing.  ¨ Removing whatever was used to close your incision.  · Check your incision area every day for signs of infection. Watch for:  ¨ Redness, swelling, or pain.  ¨ Fluid, blood, or pus.  Managing Pain, Stiffness, and Swelling  · If directed, apply ice to the injured area (unless you have a cast).  ¨ Put ice in a plastic bag.  ¨ Place a towel between your skin and the bag.  ¨ Leave the ice on for 20 minutes, 2-3 times per day.  · Raise (elevate) the injured area above the level of your heart while you are sitting or lying down.  Driving  · Do not drive or operate heavy machinery while taking pain medicine.  · Do not drive while wearing a cast, splint, or brace on a hand or foot that you use for driving.  Activity  · Return to your normal activities as directed by your health care provider. Ask your health care provider what activities are safe for you.  ¨ You may need to rest at home for several weeks.  ¨ You may need to avoid strenuous activity for several months.  General Instructions  · Do not put pressure on any part of a cast or splint until it is fully hardened. This may take several hours.  · Do not use any tobacco products, including cigarettes, chewing tobacco, or e-cigarettes. If you need help quitting, ask your health care provider.  · Take medicines only as directed by your health care provider. These include over-the-counter medicines and prescription medicines.  · Keep all follow-up visits as directed by your health care provider. This is important.  SEEK MEDICAL CARE IF:  · You have a fever.  · Your pain medicine is not helping.  · You have redness, swelling, or pain at the site of your incision.  · You have fluid, blood, or pus coming from your incision.  · Your cast, splint, or brace:  ¨ Is too loose or too tight.  ¨ Gets damaged.  SEEK IMMEDIATE  MEDICAL CARE IF:   · You have pain or swelling that is getting worse.  · Your calf gets red, warm, painful, or swollen.  · You have chest pain.  · You have trouble breathing.  · You have numbness or tingling.     This information is not intended to replace advice given to you by your health care provider. Make sure you discuss any questions you have with your health care provider.     Document Released: 05/03/2016 Document Reviewed: 10/21/2015  [employer] Interactive Patient Education ©2016 [employer]  Please follow up with a primary physician for blood pressure management, diabetic screening, and all other preventive health concerns.    See a Dentist as soon as possible.  Dental Fracture  You have a dental fracture or injury. This can mean the tooth is loose, has a chip in the enamel or is broken. If just the outer enamel is chipped, there is a good chance the tooth will not become infected. The only treatment needed may be to smooth off a rough edge. Fractures into the deeper layers (dentin and pulp) cause greater pain and are more likely to become infected. These require you to see a dentist as soon as possible to save the tooth.  Loose teeth may need to be wired or bonded with a plastic splint to hold them in place. A paste may be painted on the open area of the broken tooth to reduce the pain. Antibiotics and pain medicine may be prescribed. Choosing a soft or liquid diet and rinsing the mouth out with warm water after meals may be helpful.  See your dentist as recommended. Failure to seek care or follow up with a dentist or other specialist as recommended could result in the loss of your tooth, infection, or permanent dental problems.  SEEK MEDICAL CARE IF:   · You have increased pain not controlled with medicines.  · You have swelling around the tooth, in the face or neck.  · You have bleeding which starts, continues, or gets worse.  · You have a fever.  Document Released: 01/25/2006 Document Revised:  03/11/2013 Document Reviewed: 11/09/2010  [employer]® Patient Information ©2014 [employer].

## 2017-09-03 NOTE — ED NOTES
Pt given discharge instructions/prescription/ home care instructions, pt verbalized understanding of instructions given, pt ambulatory to TERESA lynne.

## 2017-09-03 NOTE — ED NOTES
Pt to triage c/o right lower dental pain from broken tooth. Pt advised to return to triage nurse for any changes or concerns.

## 2017-09-03 NOTE — ED PROVIDER NOTES
"ED Provider Note    Scribed for Holly Astorga D.O. by Emmy London. 9/3/2017, 11:07 AM.    Primary care provider: Thomas Venegas M.D.  Means of arrival: Walk in  History obtained from: Patient  History limited by: None    CHIEF COMPLAINT  Chief Complaint   Patient presents with   • Dental Pain       HPI  Nohemi Michele is a 41 y.o. female who presents to the Emergency Department for dental pain with an onset of 2 days. Patient reports fracturing her tooth several days ago on her right lower jaw while eating a hard candy. Part of the tooth remains in her jaw and is loose.  She complains of associated dental pain to her fractured tooth which is constant. It interferes with her biting down and talking. She complains of pain when closing her mouth. Negative for fever, chills, drooling, difficulty breathing or facial swelling.      REVIEW OF SYSTEMS  Review of Systems   Constitutional: Negative for chills and fever.   HENT:        Positive dental pain to right lower jaw and fractured tooth. Negative for drooling.   Respiratory:        Negative for difficulty breathing.   Skin:        Negative for facial swelling.     See HPI for further details. E.      PAST MEDICAL HISTORY  Patient has a past medical history of Diabetes (CMS-HCC); Epilepsy (CMS-HCC); and Hypertension.      SURGICAL HISTORY  Patient has a past surgical history that includes hysterectomy radical and gyn surgery.      SOCIAL HISTORY  Social History   Substance Use Topics   • Smoking status: Current Every Day Smoker     Packs/day: 1.50     Types: Cigarettes   • Alcohol use Yes      Comment: \"a fifth when I drink\"      History   Drug Use     Comment: marijuana   Patient is employed.      FAMILY HISTORY  Family History   Problem Relation Age of Onset   • Alcohol/Drug Neg Hx    • Diabetes Father        CURRENT MEDICATIONS  Home Medications    **Home medications have not yet been reviewed for this encounter**         ALLERGIES  None      PHYSICAL " "EXAM  VITAL SIGNS: BP (!) 168/90   Pulse (!) 101   Temp 37.1 °C (98.7 °F)   Resp 18   Ht 1.753 m (5' 9\")   Wt (!) 143.9 kg (317 lb 3.9 oz)   SpO2 96%   BMI 46.85 kg/m²     Vitals reviewed.    Constitutional: Patient is oriented to person, place, and time. Appears well-developed and well-nourished. No distress.    Head: Normocephalic and atraumatic.   Mouth/Throat: Oropharynx is clear and moist, no exudates. Right lower molar is broken and there is a segment on the tongue side that is loose but there is no surrounding gum swelling or evidence of abscess. No swelling to the floor of the mouth. No facial swelling. She is managing her own secretions.  Eyes: Conjunctivae are normal.   Cardiovascular: Normal rate, regular rhythm and normal heart sounds.   Pulmonary/Chest: Effort normal and breath sounds normal. No respiratory distress, no wheezes, rhonchi, or rales.  Lymphadenopathy: No cervical adenopathy.   Skin: Skin is warm and dry. No erythema. No pallor.   Psychiatric: Patient has a normal mood and affect.        COURSE & MEDICAL DECISION MAKING  Pertinent Labs & Imaging studies reviewed. (See chart for details)    11:05 AM Reviewed patient's electronic medical record which indicates a hospital admission on 07/2017 for a seizure.    11:09 AM  Patient seen and examined at bedside. Patient presents for dental pain.     Attempted to manually remove the loose segment. Unable to remove the tooth at this time. Recommended the patient attempt to further loosen and remove the tooth over the next few days with simple movement of her tooth or manually with her fingers as tolerated. And recommend follow-up with the dentist on Tuesday of this is a holiday weekend and Monday is a holiday.     She was asked to follow up with a dentist at the [facility]. Tylenol and warm compresses are recommended for pain. She will be discharged with a prescription for Veetid, for what I suspect is an early dental infection.    The " "patient will return for new or persisting symptoms including fevers, chills, facial swelling, increased dental pain, drooling or difficulty breathing.  The patient verbalizes understanding and will comply.  Patient is stable at the time of discharge.  Vital signs were reviewed: BP (!) 168/90   Pulse (!) 101   Temp 37.1 °C (98.7 °F)   Resp 18   Ht 1.753 m (5' 9\")   Wt (!) 143.9 kg (317 lb 3.9 oz)   SpO2 96%   BMI 46.85 kg/m²        DISPOSITION  Patient will be discharged home in stable condition.      FOLLOW UP  [facility]  [address]  [place] [address]  [phone]      for dental care    [facility], Emergency Dept  [address] [address] [phone]    If symptoms worsen      The patient is referred to a primary physician for blood pressure management, diabetic screening, and for all other preventative health concerns.      OUTPATIENT MEDICATIONS:  New Prescriptions    PENICILLIN V POTASSIUM (VEETID) 500 MG TAB    Take 1 Tab by mouth 4 times a day for 10 days.         DIAGNOSIS  1. Pain, dental    2. Closed fracture of tooth, initial encounter           The note accurately reflects work and decisions made by me.  Holly Astorga  9/3/2017  11:46 AM     IEmmy (Scribe), am scribing for, and in the presence of, Holly Astorga D.O.    Electronically signed by: Emmy London (Francisibdamien), 9/3/2017    Holly DESIR D.O. personally performed the services described in this documentation, as scribed by Emmy London in my presence, and it is both accurate and complete.            "

## 2017-09-11 ENCOUNTER — HOSPITAL ENCOUNTER (EMERGENCY)
Facility: MEDICAL CENTER | Age: 42
End: 2017-09-11
Payer: MEDICAID

## 2017-09-11 ENCOUNTER — HOSPITAL ENCOUNTER (EMERGENCY)
Facility: MEDICAL CENTER | Age: 42
End: 2017-09-11
Attending: EMERGENCY MEDICINE
Payer: MEDICAID

## 2017-09-11 VITALS
RESPIRATION RATE: 18 BRPM | BODY MASS INDEX: 41.47 KG/M2 | OXYGEN SATURATION: 97 % | HEART RATE: 141 BPM | HEIGHT: 69 IN | SYSTOLIC BLOOD PRESSURE: 152 MMHG | WEIGHT: 280 LBS | TEMPERATURE: 97.4 F | DIASTOLIC BLOOD PRESSURE: 126 MMHG

## 2017-09-11 VITALS
HEART RATE: 115 BPM | TEMPERATURE: 98.6 F | HEIGHT: 66 IN | WEIGHT: 280 LBS | SYSTOLIC BLOOD PRESSURE: 190 MMHG | OXYGEN SATURATION: 92 % | BODY MASS INDEX: 45 KG/M2 | DIASTOLIC BLOOD PRESSURE: 120 MMHG | RESPIRATION RATE: 20 BRPM

## 2017-09-11 DIAGNOSIS — R45.1 AGITATION: ICD-10-CM

## 2017-09-11 DIAGNOSIS — F19.10 POLYSUBSTANCE ABUSE (HCC): ICD-10-CM

## 2017-09-11 DIAGNOSIS — F41.8 SITUATIONAL ANXIETY: ICD-10-CM

## 2017-09-11 LAB
AMPHET UR QL SCN: POSITIVE
BARBITURATES UR QL SCN: POSITIVE
BENZODIAZ UR QL SCN: NEGATIVE
BZE UR QL SCN: NEGATIVE
CANNABINOIDS UR QL SCN: POSITIVE
EKG IMPRESSION: NORMAL
GLUCOSE BLD-MCNC: 210 MG/DL (ref 65–99)
METHADONE UR QL SCN: NEGATIVE
OPIATES UR QL SCN: POSITIVE
OXYCODONE UR QL SCN: NEGATIVE
PCP UR QL SCN: NEGATIVE
PROPOXYPH UR QL SCN: NEGATIVE

## 2017-09-11 PROCEDURE — 304562 HCHG STAT O2 MASK/CANNULA

## 2017-09-11 PROCEDURE — 304561 HCHG STAT O2

## 2017-09-11 PROCEDURE — 302449 STATCHG TRIAGE ONLY (STATISTIC)

## 2017-09-11 PROCEDURE — 82962 GLUCOSE BLOOD TEST: CPT

## 2017-09-11 PROCEDURE — 80307 DRUG TEST PRSMV CHEM ANLYZR: CPT

## 2017-09-11 PROCEDURE — 96376 TX/PRO/DX INJ SAME DRUG ADON: CPT

## 2017-09-11 PROCEDURE — 96361 HYDRATE IV INFUSION ADD-ON: CPT

## 2017-09-11 PROCEDURE — 700105 HCHG RX REV CODE 258: Performed by: EMERGENCY MEDICINE

## 2017-09-11 PROCEDURE — 99284 EMERGENCY DEPT VISIT MOD MDM: CPT

## 2017-09-11 PROCEDURE — 96374 THER/PROPH/DIAG INJ IV PUSH: CPT

## 2017-09-11 PROCEDURE — 700111 HCHG RX REV CODE 636 W/ 250 OVERRIDE (IP)

## 2017-09-11 PROCEDURE — 700111 HCHG RX REV CODE 636 W/ 250 OVERRIDE (IP): Performed by: EMERGENCY MEDICINE

## 2017-09-11 PROCEDURE — 93005 ELECTROCARDIOGRAM TRACING: CPT

## 2017-09-11 RX ORDER — LORAZEPAM 2 MG/ML
2 INJECTION INTRAMUSCULAR ONCE
Status: COMPLETED | OUTPATIENT
Start: 2017-09-11 | End: 2017-09-11

## 2017-09-11 RX ORDER — SODIUM CHLORIDE 9 MG/ML
2000 INJECTION, SOLUTION INTRAVENOUS ONCE
Status: COMPLETED | OUTPATIENT
Start: 2017-09-11 | End: 2017-09-11

## 2017-09-11 RX ORDER — LORAZEPAM 2 MG/ML
INJECTION INTRAMUSCULAR
Status: COMPLETED
Start: 2017-09-11 | End: 2017-09-11

## 2017-09-11 RX ADMIN — SODIUM CHLORIDE 2000 ML: 9 INJECTION, SOLUTION INTRAVENOUS at 21:58

## 2017-09-11 RX ADMIN — LORAZEPAM 2 MG: 2 INJECTION INTRAMUSCULAR at 19:32

## 2017-09-11 RX ADMIN — LORAZEPAM 2 MG: 2 INJECTION INTRAMUSCULAR; INTRAVENOUS at 21:58

## 2017-09-11 RX ADMIN — LORAZEPAM 2 MG: 2 INJECTION INTRAMUSCULAR; INTRAVENOUS at 19:32

## 2017-09-11 ASSESSMENT — PAIN SCALES - GENERAL: PAINLEVEL_OUTOF10: ASSUMED PAIN PRESENT

## 2017-09-12 NOTE — ED NOTES
Discharge instructions provided & verbalized understanding of follow-up care & reasons to return to ED.  Released in stable condition & escorted to lobby.

## 2017-09-12 NOTE — ED PROVIDER NOTES
ED Provider Note    Scribed for Heriberto Castillo M.D. by Heriberto Castillo. 9/11/2017,  8:01 PM.    CHIEF COMPLAINT  Chief Complaint   Patient presents with   • Seizure       HPI  Nohemi Michele is a 41 y.o. female with a known history of seizure disorder who presents to the Emergency DepartmentFor complaints related to smoking marijuana. She was seen here earlier today, complaining of feeling anxious and tremulous after smoking a pre-rolled marijuana cigarette from a local recreational dispensary. At that time, she was noted to be tachypneic, tachycardic, hypertensive, very anxious. However, she left AMA before being seen by provider, after the screening triage questions at the bedside involved questions about recreational drug use. She left against medical advice despite being informed that this is a standard triage question. She reports that she became progressively more angry in the waiting room because extended wait times, and then reportedly had had a witnessed episode of seizure-like activity in the waiting room, was immediately roomed, and given 2 mg of intramuscular Ativan. This was prior to being resumed. Prior to my evaluation, at 1947 hrs., the patient was noted to be alert and oriented ×4, after Ativan administration. At the bedside, she remains anxious, with pressured speech, and tachycardia on the monitor. She reports that her body feels like it wants to move without heard. She said that she is smoked pot many times without these symptoms. She says that yesterday, she and her brother, who she reports is a methamphetamine addict, but 3 blunts from dispensary, smoked one yesterday without side effects, and then she smoked another one today, and immediately started feeling the way she does now, and has had that set of symptoms persistently across her AMA visit and this one. She denies fevers or chills. She said that she had some nausea and vomiting and diarrhea this morning, but that is resolved.  "She reports compliance with her antiepileptic medications. She says she refuses to take her Prozac.     Her records show that she was admitted in July for noncompliance with her seizure medications, and had well-documented pseudoseizures.      REVIEW OF SYSTEMS  See HPI for further details. All other systems are negative.     PAST MEDICAL HISTORY   has a past medical history of Diabetes ([facility]); Epilepsy (CMS-[facility]); and Hypertension.    SOCIAL HISTORY  Social History     Social History Main Topics   • Smoking status: Current Every Day Smoker     Packs/day: 1.50     Types: Cigarettes   • Smokeless tobacco: Not on file   • Alcohol use Yes      Comment: \"a fifth when I drink\"   • Drug use:       Comment: marijuana   • Sexual activity: Not on file     History   Drug Use     Comment: marijuana       SURGICAL HISTORY   has a past surgical history that includes hysterectomy radical and gyn surgery.    CURRENT MEDICATIONS  Home Medications     Reviewed by Courtney Mayorga R.N. (Registered Nurse) on 09/11/17 at 1948  Med List Status: Not Addressed   Medication Last Dose Status   fluoxetine (PROZAC) 20 MG Cap Unknown Active   insulin glargine (BASAGLAR KWIKPEN) 100 UNIT/ML Solution Pen-injector injection Unknown Active   lisinopril (PRINIVIL) 20 MG Tab 7/17/2017 Active   mirtazapine (REMERON) 15 MG TABLET DISPERSIBLE Unknown Active   penicillin v potassium (VEETID) 500 MG Tab  Active                ALLERGIES  No Known Allergies    PHYSICAL EXAM  VITAL SIGNS: BP (!) 190/120   Pulse (!) 115   Temp 37 °C (98.6 °F) (Temporal)   Resp 20   Ht 1.676 m (5' 6\")   Wt (!) 127 kg (280 lb)   LMP 08/26/2017 (Exact Date)   SpO2 92%   BMI 45.19 kg/m²   Pulse ox interpretation: I interpret this pulse ox as normal.  Constitutional: Alert in no apparent distress. Anxious, physically and verbally agitated.  HENT: No signs of trauma, Bilateral external ears normal, Nose normal.   Eyes: Conjunctiva normal, Non-icteric. Pupils bilaterally " dilated to 4-5 L  Neck: Normal range of motion, Supple, No stridor.   Lymphatic: No lymphadenopathy noted.   Cardiovascular: Regular rate and rhythm, no murmurs.   Thorax & Lungs: Normal breath sounds, No respiratory distress, No wheezing, No chest tenderness.   Abdomen: Bowel sounds normal, Soft, No tenderness, No masses, No pulsatile masses. No peritoneal signs.  Skin: Warm, Dry, No erythema, No rash.   Extremities: Intact distal pulses, No edema, No cyanosis.  Musculoskeletal: Good range of motion in all major joints. No or major deformities noted.   Neurologic: Alert , Normal motor function, Normal sensory function, No focal deficits noted.   Psychiatric: Affect anxious, agitated. Judgment fair.    DIAGNOSTIC STUDIES / PROCEDURES    EKG  Interpreted by me    SINUS TACHYCARDIA  PAIRED VENTRICULAR PREMATURE COMPLEXES  BORDERLINE R WAVE PROGRESSION, ANTERIOR LEADS  BORDERLINE REPOLARIZATION ABNORMALITY  BASELINE WANDER IN LEAD(S) I,II,aVR,aVF,V1,V2,V4  Compared to ECG 07/17/2017 13:55:38  Ventricular premature complex(es) now present  Sinus rhythm no longer present  T-wave abnormality no longer present    LABS  Labs Reviewed   URINE DRUG SCREEN - Abnormal; Notable for the following:        Result Value    Amphetamines Urine Positive (*)     Barbiturates Positive (*)     Opiates Positive (*)     Cannabinoid Metab Positive (*)     All other components within normal limits   ACCU-CHEK GLUCOSE - Abnormal; Notable for the following:     Glucose - Accu-Ck 210 (*)     All other components within normal limits     All labs reviewed by me.    RADIOLOGY  No orders to display     The radiologist's interpretation of all radiological studies have been reviewed by me.    COURSE & MEDICAL DECISION MAKING  Nursing notes, VS, PMSFHx reviewed in chart.     8:01 PM Patient seen and examined at bedside. Differential diagnosis includes but is not limited to Anxiety, agitation, substance abuse. Ordered for urine drug screen to evaluate.  Patient will be treated with an additional 2 mg of Ativan, IV, and a 2 L normal saline bolus for her symptoms.     10:26 PM This patient has a urine drug screen is positive for amphetamines, barbiturates, opiates, and marijuana. These medications alone or in combination could cause her anxiety symptoms, or lower her seizure threshold. I have explained that there is no way to immediately purge these symptoms from her system, although she asked me to do so. I explained that only time will make the patient feel better, as long as she avoids any further substance exposure. She is discharged in stable condition.    The patient will return for new or worsening symptoms and is stable at the time of discharge.    The patient is referred to a primary physician for blood pressure management, diabetic screening, and for all other preventative health concerns.    DISPOSITION:  Patient will be discharged home in stable condition.    FOLLOW UP:  Thomas Venegsa M.D.  [address]  [place] [address]  [phone]      As needed      OUTPATIENT MEDICATIONS:  New Prescriptions    No medications on file         FINAL IMPRESSION  1. Agitation    2. Situational anxiety    3. Polysubstance abuse

## 2017-09-12 NOTE — DISCHARGE INSTRUCTIONS
Your urine drug screen was positive for methamphetamine, opiates, barbiturates, and marijuana. Any of these substances could make you feel the way you are feeling. Those substances in combination are almost certain to make you feel unwell. Any of these medications can lower your seizure threshold, making it more likely for you to have a seizure. You should not use any recreational drugs, or use any prescription medications that are not prescribed to you. As long as you avoid any further exposure to these substances, you will gradually feel better over the next 12-24 hours.    Substance Abuse  Your exam indicates that you have a problem with substance abuse. Substance abuse is the misuse of alcohol or drugs that causes problems in family life, friendships, and work relationships. Substance abuse is the most important cause of premature illness, disability, and death in our society. It is also the greatest threat to a person's mental and spiritual well being.  Substance abuse can start out in an innocent way, such as social drinking or taking a little extra medication prescribed by your doctor. No one starts out with the intention of becoming an alcoholic or an addict. Substance abuse victims cannot control their use of alcohol or drugs. They may become intoxicated daily or go on weekend binges. Often there is a strong desire to quit, but attempts to stop using often fail. Encounters with law enforcement or conflicts with family members, friends, and work associates are signs of a potential problem.  Recovery is always possible, although the craving for some drugs makes it difficult to quit without assistance. Many treatment programs are available to help people stop abusing alcohol or drugs. The first step in treatment is to admit you have a problem. This is a major yariel because denial is a powerful force with substance abuse.  Alcoholics Anonymous, Narcotics Anonymous, Cocaine Anonymous, and other recovery groups  and programs can be very useful in helping people to quit. If you do not feel okay about your drug or alcohol use and if it is causing you trouble, we want to encourage you to talk about it with your doctor or with someone from a recovery group who can help you. You could also call the [employer] at 7-394-232-KGLA. It is up to you to take the first step.  [employer] and A[employer]-TE[employer] are support groups for friends and family members of an alcohol or drug dependent person. The people who love and care for the alcoholic or addicted person often need help, too. For information about these organizations, check your phone directory or call a local alcohol or drug treatment center.  Document Released: 01/25/2006 Document Revised: 03/11/2013 Document Reviewed: 12/19/2009  [employer]® Patient Information ©2014 [employer].

## 2017-09-12 NOTE — ED NOTES
"Patient states \"I don't want to be at this hospital. Every time I come here they ask if I do drugs.\" Patient educated that this is a standard question asked during all assessments. Patient on phone w/ family telling them to come pick her up. Patient refuses to stay in R4. Patient demanding to be taken back to triage. Patient leaving without being seen by medical provider. Refuses to sign AMA form.     Patient is alert and oriented x 4.   "

## 2017-09-12 NOTE — ED NOTES
"Chief Complaint   Patient presents with   • Drug Ingestion     Pt to triage via wheelchair. Pt states she smoked a pre-rolled marijuana cigarette from [employer], states she has never felt this way after smoking weed. Pt tachypnic, tachycardic, hypertensive, extremely anxious, tremulous.  Charge RN aware of patient.      /126   Pulse (!) 141   Temp 36.3 °C (97.4 °F) (Temporal)   Resp 18   Ht 1.753 m (5' 9\")   Wt (!) 127 kg (280 lb)   SpO2 97%   BMI 41.35 kg/m²     Pt to R 4.     "

## 2017-09-12 NOTE — ED NOTES
Pt brought back from triage, actively seizing.  Given Ativan 2mg IM, followed by post-ictal phase.  Pt A&Ox4 approximately 10 min post seizure.    Pt reports smoking a joint today & then not feeling right.  Hx epilepsy.

## 2017-11-07 ENCOUNTER — APPOINTMENT (OUTPATIENT)
Dept: URGENT CARE | Facility: PHYSICIAN GROUP | Age: 42
End: 2017-11-07
Payer: COMMERCIAL

## 2020-09-29 NOTE — ED NOTES
Pt discharged home. Assessment complete. Pt ambulates self. VS stable. Pt verbalized discharge instructions. Prescriptions given pt verbalizes teaching provided. Pt being discharged to self, and being driven to police station. Belongings with pt.      Returned call to pt. No assistance needed.   Had virtual visit on 9/28/20